# Patient Record
Sex: FEMALE | Employment: OTHER
[De-identification: names, ages, dates, MRNs, and addresses within clinical notes are randomized per-mention and may not be internally consistent; named-entity substitution may affect disease eponyms.]

---

## 2021-01-01 ENCOUNTER — OFFICE VISIT (OUTPATIENT)
Age: 61
End: 2021-01-01

## 2021-06-21 ENCOUNTER — TELEPHONE ENCOUNTER (OUTPATIENT)
Dept: URBAN - METROPOLITAN AREA CLINIC 9 | Facility: CLINIC | Age: 61
End: 2021-06-21

## 2021-07-01 ENCOUNTER — TELEPHONE ENCOUNTER (OUTPATIENT)
Dept: URBAN - METROPOLITAN AREA CLINIC 9 | Facility: CLINIC | Age: 61
End: 2021-07-01

## 2021-07-06 ENCOUNTER — TELEPHONE ENCOUNTER (OUTPATIENT)
Dept: URBAN - METROPOLITAN AREA CLINIC 9 | Facility: CLINIC | Age: 61
End: 2021-07-06

## 2021-07-07 ENCOUNTER — TELEPHONE ENCOUNTER (OUTPATIENT)
Dept: URBAN - METROPOLITAN AREA CLINIC 9 | Facility: CLINIC | Age: 61
End: 2021-07-07

## 2021-08-01 ENCOUNTER — OFFICE VISIT (OUTPATIENT)
Age: 61
End: 2021-08-01

## 2021-08-05 ENCOUNTER — OFFICE VISIT (OUTPATIENT)
Dept: URBAN - METROPOLITAN AREA CLINIC 9 | Facility: CLINIC | Age: 61
End: 2021-08-05

## 2021-12-22 ENCOUNTER — LAB OUTSIDE AN ENCOUNTER (OUTPATIENT)
Age: 61
End: 2021-12-22

## 2021-12-22 LAB
BUN/CREATININE RATIO: (no result)
CALCIUM: (no result)
CARBON DIOXIDE: (no result)
CHLORIDE: (no result)
CREATININE: (no result)
EGFR AFRICAN AMERICAN: 110
EGFR NON-AFR. AMERICAN: 94
GLUCOSE: (no result)
POTASSIUM: (no result)
SODIUM: (no result)
UREA NITROGEN (BUN): (no result)

## 2021-12-23 ENCOUNTER — TELEPHONE ENCOUNTER (OUTPATIENT)
Dept: URBAN - METROPOLITAN AREA CLINIC 9 | Facility: CLINIC | Age: 61
End: 2021-12-23

## 2022-05-05 ENCOUNTER — LAB OUTSIDE AN ENCOUNTER (OUTPATIENT)
Age: 62
End: 2022-05-05

## 2022-05-06 ENCOUNTER — TELEPHONE ENCOUNTER (OUTPATIENT)
Dept: URBAN - METROPOLITAN AREA CLINIC 9 | Facility: CLINIC | Age: 62
End: 2022-05-06

## 2022-05-06 LAB
BUN/CREATININE RATIO: (no result)
CALCIUM: (no result)
CARBON DIOXIDE: (no result)
CHLORIDE: (no result)
CREATININE: (no result)
EGFR AFRICAN AMERICAN: 114
EGFR NON-AFR. AMERICAN: 99
GLUCOSE: (no result)
POTASSIUM: (no result)
SODIUM: (no result)
UREA NITROGEN (BUN): (no result)

## 2022-05-17 ENCOUNTER — OFFICE VISIT (OUTPATIENT)
Dept: URBAN - METROPOLITAN AREA CLINIC 9 | Facility: CLINIC | Age: 62
End: 2022-05-17

## 2022-05-31 ENCOUNTER — OFFICE VISIT (OUTPATIENT)
Dept: URBAN - METROPOLITAN AREA CLINIC 9 | Facility: CLINIC | Age: 62
End: 2022-05-31

## 2022-07-18 ENCOUNTER — TELEPHONE ENCOUNTER (OUTPATIENT)
Dept: URBAN - METROPOLITAN AREA CLINIC 9 | Facility: CLINIC | Age: 62
End: 2022-07-18

## 2022-07-30 ENCOUNTER — TELEPHONE ENCOUNTER (OUTPATIENT)
Age: 62
End: 2022-07-30

## 2022-07-30 RX ORDER — SUCRALFATE 1 G/10ML
10 MLS SUSPENSION ORAL
Qty: 0 | Refills: 4 | OUTPATIENT
Start: 2013-06-24 | End: 2017-04-21

## 2022-07-30 RX ORDER — PREDNISONE 10 MG/1
1 (ONE) TABLET ORAL AS DIRECTED
Qty: 0 | Refills: 2 | OUTPATIENT
Start: 2018-02-28 | End: 2018-04-29

## 2022-07-30 RX ORDER — OMEPRAZOLE 20 MG/1
1 CAPSULE, DELAYED RELEASE ORAL AS NEEDED
Qty: 0 | Refills: 16 | OUTPATIENT
Start: 2012-11-15 | End: 2017-04-21

## 2022-07-30 RX ORDER — MESALAMINE 800 MG/1
2 (TWO) TABLET, DELAYED RELEASE ORAL
Qty: 0 | Refills: 13 | OUTPATIENT
Start: 2018-02-28 | End: 2018-02-28

## 2022-07-30 RX ORDER — MESALAMINE 800 MG/1
3 (THREE) TABLET, DELAYED RELEASE ORAL DAILY
Qty: 0 | Refills: 2 | OUTPATIENT
Start: 2014-08-29 | End: 2017-04-21

## 2022-07-30 RX ORDER — PREDNISONE 10 MG/1
1 (ONE) TABLET ORAL
Qty: 0 | Refills: 2 | OUTPATIENT
Start: 2013-06-24 | End: 2013-08-19

## 2022-07-30 RX ORDER — METRONIDAZOLE 500 MG/1
1 (ONE) TABLET ORAL
Qty: 0 | Refills: 2 | OUTPATIENT
Start: 2018-02-23 | End: 2018-02-28

## 2022-07-30 RX ORDER — MESALAMINE 800 MG/1
3 (THREE) TABLET, DELAYED RELEASE ORAL DAILY
Qty: 0 | Refills: 2 | OUTPATIENT
Start: 2017-05-16 | End: 2017-06-15

## 2022-07-30 RX ORDER — MESALAMINE 800 MG/1
2 (TWO) TABLET, DELAYED RELEASE ORAL
Qty: 0 | Refills: 13 | OUTPATIENT
Start: 2017-06-25 | End: 2018-02-28

## 2022-07-30 RX ORDER — HYDROCORTISONE ACETATE 25 MG/1
1 (ONE) SUPPOSITORY RECTAL
Qty: 0 | Refills: 3 | OUTPATIENT
Start: 2013-06-10 | End: 2013-06-24

## 2022-07-30 RX ORDER — METRONIDAZOLE 500 MG/1
1 (ONE) TABLET ORAL
Qty: 0 | Refills: 2 | OUTPATIENT
Start: 2018-08-20 | End: 2018-08-30

## 2022-07-30 RX ORDER — MESALAMINE 800 MG/1
3 (THREE) TABLET, DELAYED RELEASE ORAL DAILY
Qty: 0 | Refills: 2 | OUTPATIENT
Start: 2014-07-02 | End: 2014-08-01

## 2022-07-30 RX ORDER — MESALAMINE 800 MG/1
3 (THREE) TABLET, DELAYED RELEASE ORAL DAILY
Qty: 0 | Refills: 2 | OUTPATIENT
Start: 2017-06-20 | End: 2017-07-20

## 2022-07-30 RX ORDER — PREDNISONE 10 MG/1
1 (ONE) TABLET ORAL
Qty: 0 | Refills: 2 | OUTPATIENT
Start: 2012-01-19 | End: 2012-03-15

## 2022-07-30 RX ORDER — METRONIDAZOLE 500 MG/1
1 (ONE) TABLET ORAL
Qty: 0 | Refills: 2 | OUTPATIENT
Start: 2017-07-19 | End: 2017-07-26

## 2022-07-30 RX ORDER — CIPROFLOXACIN HCL 500 MG
1 (ONE) TABLET ORAL
Qty: 0 | Refills: 3 | OUTPATIENT
Start: 2013-06-10 | End: 2013-06-24

## 2022-07-30 RX ORDER — OMEPRAZOLE 20 MG/1
1 (ONE) TABLET, DELAYED RELEASE ORAL
Qty: 0 | Refills: 2 | OUTPATIENT
Start: 2021-07-06 | End: 2021-08-05

## 2022-07-30 RX ORDER — SUCRALFATE 1 G/10ML
10 MLS SUSPENSION ORAL
Qty: 0 | Refills: 4 | OUTPATIENT
Start: 2012-11-30 | End: 2013-06-10

## 2022-07-30 RX ORDER — MESALAMINE 1.2 G/1
2 (TWO) TABLET, DELAYED RELEASE ORAL DAILY
Qty: 0 | Refills: 16 | OUTPATIENT
Start: 2021-08-05 | End: 2022-05-31

## 2022-07-30 RX ORDER — MULTIVIT-MIN/IRON/FOLIC ACID/K 18-600-40
1 (ONE) CAPSULE ORAL
Qty: 0 | Refills: 16 | OUTPATIENT
Start: 2019-03-25 | End: 2022-05-31

## 2022-07-30 RX ORDER — ACETAMINOPHEN 325 MG/1
2 (TWO) TABLET, FILM COATED ORAL
Qty: 0 | Refills: 2 | OUTPATIENT
Start: 2013-06-25 | End: 2013-07-25

## 2022-07-30 RX ORDER — METRONIDAZOLE 375 MG/1
1 CAPSULE ORAL
Qty: 0 | Refills: 2 | OUTPATIENT
Start: 2013-06-10 | End: 2013-06-20

## 2022-07-30 RX ORDER — BUDESONIDE 3 MG/1
1 (ONE) CAPSULE, COATED PELLETS ORAL
Qty: 0 | Refills: 3 | OUTPATIENT
Start: 2018-09-19 | End: 2019-03-25

## 2022-07-30 RX ORDER — PREDNISONE 10 MG/1
1 (ONE) TABLET ORAL AS DIRECTED
Qty: 0 | Refills: 2 | OUTPATIENT
Start: 2017-06-23 | End: 2017-08-18

## 2022-07-30 RX ORDER — PREDNISONE 10 MG/1
2 (TWO) TABLET ORAL DAILY
Qty: 0 | Refills: 2 | OUTPATIENT
Start: 2017-05-26 | End: 2017-06-09

## 2022-07-31 ENCOUNTER — TELEPHONE ENCOUNTER (OUTPATIENT)
Age: 62
End: 2022-07-31

## 2022-07-31 RX ORDER — MESALAMINE 800 MG/1
3 (THREE) TABLET, DELAYED RELEASE ORAL DAILY
Qty: 0 | Refills: 2 | Status: ACTIVE | COMMUNITY
Start: 2014-07-02

## 2022-07-31 RX ORDER — MESALAMINE 800 MG/1
3 (THREE) TABLET, DELAYED RELEASE ORAL DAILY
Qty: 0 | Refills: 9 | Status: ACTIVE | COMMUNITY
Start: 2012-11-15

## 2022-07-31 RX ORDER — MESALAMINE 800 MG/1
3 (THREE) TABLET, DELAYED RELEASE ORAL DAILY
Qty: 0 | Refills: 2 | Status: ACTIVE | COMMUNITY
Start: 2014-02-27

## 2022-07-31 RX ORDER — MESALAMINE 800 MG/1
3 (THREE) TABLET, DELAYED RELEASE ORAL DAILY
Qty: 0 | Refills: 4 | Status: ACTIVE | COMMUNITY
Start: 2014-01-30

## 2022-07-31 RX ORDER — PREDNISONE 10 MG/1
1 (ONE) TABLET ORAL AS DIRECTED
Qty: 0 | Refills: 2 | Status: ACTIVE | COMMUNITY
Start: 2018-02-28

## 2022-07-31 RX ORDER — MESALAMINE 800 MG/1
2 (TWO) TABLET, DELAYED RELEASE ORAL
Qty: 0 | Refills: 13 | Status: ACTIVE | COMMUNITY
Start: 2017-06-25

## 2022-07-31 RX ORDER — MESALAMINE 800 MG/1
3 (THREE) TABLET, DELAYED RELEASE ORAL DAILY
Qty: 0 | Refills: 2 | Status: ACTIVE | COMMUNITY
Start: 2017-05-15

## 2022-07-31 RX ORDER — MESALAMINE 800 MG/1
3 (THREE) TABLET, DELAYED RELEASE ORAL DAILY
Qty: 0 | Refills: 2 | Status: ACTIVE | COMMUNITY
Start: 2017-05-16

## 2022-07-31 RX ORDER — MESALAMINE 800 MG/1
3 (THREE) TABLET, DELAYED RELEASE ORAL DAILY
Qty: 0 | Refills: 2 | Status: ACTIVE | COMMUNITY
Start: 2017-06-20

## 2022-07-31 RX ORDER — MESALAMINE 800 MG/1
3 (THREE) TABLET, DELAYED RELEASE ORAL DAILY
Qty: 0 | Refills: 2 | Status: ACTIVE | COMMUNITY
Start: 2017-04-21

## 2022-07-31 RX ORDER — SUCRALFATE 1 G/10ML
10 MLS SUSPENSION ORAL
Qty: 0 | Refills: 4 | Status: ACTIVE | COMMUNITY
Start: 2012-11-30

## 2022-07-31 RX ORDER — MESALAMINE 1.2 G/1
2 (TWO) TABLET, DELAYED RELEASE ORAL DAILY
Qty: 0 | Refills: 16 | Status: ACTIVE | COMMUNITY
Start: 2021-08-05

## 2022-07-31 RX ORDER — MESALAMINE 1.2 G/1
4 (FOUR) TABLET, DELAYED RELEASE ORAL DAILY
Qty: 0 | Refills: 16 | Status: ACTIVE | COMMUNITY
Start: 2019-03-12

## 2022-07-31 RX ORDER — OMEPRAZOLE 20 MG/1
1 CAPSULE, DELAYED RELEASE ORAL AS NEEDED
Qty: 0 | Refills: 16 | Status: ACTIVE | COMMUNITY

## 2022-07-31 RX ORDER — METRONIDAZOLE 500 MG/1
1 (ONE) TABLET ORAL
Qty: 0 | Refills: 2 | Status: ACTIVE | COMMUNITY
Start: 2018-02-23

## 2022-07-31 RX ORDER — PREDNISONE 10 MG/1
1 (ONE) TABLET ORAL
Qty: 0 | Refills: 2 | Status: ACTIVE | COMMUNITY
Start: 2012-01-19

## 2022-07-31 RX ORDER — MESALAMINE 1.2 G/1
4 (FOUR) TABLET, DELAYED RELEASE ORAL DAILY
Qty: 0 | Refills: 5 | Status: ACTIVE | COMMUNITY
Start: 2021-07-01

## 2022-07-31 RX ORDER — MULTIVIT-MIN/IRON/FOLIC ACID/K 18-600-40
1 (ONE) CAPSULE ORAL
Qty: 0 | Refills: 16 | Status: ACTIVE | COMMUNITY
Start: 2019-03-25

## 2022-07-31 RX ORDER — MESALAMINE 1.2 G/1
4 (FOUR) TABLET, DELAYED RELEASE ORAL DAILY
Qty: 0 | Refills: 3 | Status: ACTIVE | COMMUNITY
Start: 2021-07-07

## 2022-07-31 RX ORDER — MESALAMINE 1.2 G/1
4 (FOUR) TABLET, DELAYED RELEASE ORAL DAILY
Qty: 0 | Refills: 6 | Status: ACTIVE | COMMUNITY
Start: 2020-05-26

## 2022-07-31 RX ORDER — MESALAMINE 800 MG/1
3 (THREE) TABLET, DELAYED RELEASE ORAL DAILY
Qty: 0 | Refills: 9 | Status: ACTIVE | COMMUNITY
Start: 2012-04-18

## 2022-07-31 RX ORDER — ACETAMINOPHEN 325 MG/1
2 (TWO) TABLET, FILM COATED ORAL
Qty: 0 | Refills: 2 | Status: ACTIVE | COMMUNITY
Start: 2013-06-25

## 2022-07-31 RX ORDER — MESALAMINE 1.2 G/1
4 (FOUR) TABLET, DELAYED RELEASE ORAL DAILY
Qty: 0 | Refills: 6 | Status: ACTIVE | COMMUNITY
Start: 2019-03-14

## 2022-07-31 RX ORDER — MULTIVIT-MIN/IRON/FOLIC ACID/K 18-600-40
1 (ONE) CAPSULE ORAL
Qty: 0 | Refills: 16 | Status: ACTIVE | COMMUNITY
Start: 2018-08-20

## 2022-07-31 RX ORDER — METRONIDAZOLE 500 MG/1
1 (ONE) TABLET ORAL
Qty: 0 | Refills: 2 | Status: ACTIVE | COMMUNITY
Start: 2018-08-20

## 2022-07-31 RX ORDER — PREDNISONE 10 MG/1
2 (TWO) TABLET ORAL DAILY
Qty: 0 | Refills: 2 | Status: ACTIVE | COMMUNITY
Start: 2017-05-26

## 2022-07-31 RX ORDER — METRONIDAZOLE 500 MG/1
1 (ONE) TABLET ORAL
Qty: 0 | Refills: 2 | Status: ACTIVE | COMMUNITY
Start: 2017-07-19

## 2022-07-31 RX ORDER — PREDNISONE 10 MG/1
1 (ONE) TABLET ORAL AS DIRECTED
Qty: 0 | Refills: 2 | Status: ACTIVE | COMMUNITY
Start: 2017-06-23

## 2022-07-31 RX ORDER — MESALAMINE 800 MG/1
3 (THREE) TABLET, DELAYED RELEASE ORAL DAILY
Qty: 0 | Refills: 2 | Status: ACTIVE | COMMUNITY
Start: 2014-01-31

## 2022-07-31 RX ORDER — MESALAMINE 800 MG/1
2 (TWO) TABLET, DELAYED RELEASE ORAL
Qty: 0 | Refills: 5 | Status: ACTIVE | COMMUNITY
Start: 2017-06-23

## 2022-07-31 RX ORDER — MESALAMINE 800 MG/1
1 TABLET, DELAYED RELEASE ORAL
Qty: 0 | Refills: 16 | Status: ACTIVE | COMMUNITY

## 2022-07-31 RX ORDER — MESALAMINE 800 MG/1
3 (THREE) TABLET, DELAYED RELEASE ORAL DAILY
Qty: 0 | Refills: 2 | Status: ACTIVE | COMMUNITY
Start: 2014-08-29

## 2022-07-31 RX ORDER — MESALAMINE 800 MG/1
3 (THREE) TABLET, DELAYED RELEASE ORAL DAILY
Qty: 0 | Refills: 2 | Status: ACTIVE | COMMUNITY
Start: 2014-02-26

## 2022-07-31 RX ORDER — OMEPRAZOLE 20 MG/1
1 (ONE) TABLET, DELAYED RELEASE ORAL
Qty: 0 | Refills: 5 | Status: ACTIVE | COMMUNITY
Start: 2021-07-06

## 2022-07-31 RX ORDER — FAMOTIDINE 20 MG/1
1 TABLET ORAL
Qty: 0 | Refills: 16 | Status: ACTIVE | COMMUNITY
Start: 2022-05-31

## 2022-07-31 RX ORDER — MULTIVIT-MIN/IRON/FOLIC ACID/K 18-600-40
1 (ONE) CAPSULE ORAL
Qty: 0 | Refills: 16 | Status: ACTIVE | COMMUNITY
Start: 2018-09-19

## 2022-07-31 RX ORDER — SUCRALFATE 1 G/10ML
10 MLS SUSPENSION ORAL
Qty: 0 | Refills: 4 | Status: ACTIVE | COMMUNITY
Start: 2013-06-24

## 2022-07-31 RX ORDER — MESALAMINE 800 MG/1
3 (THREE) TABLET, DELAYED RELEASE ORAL DAILY
Qty: 0 | Refills: 16 | Status: ACTIVE | COMMUNITY
Start: 2012-01-19

## 2022-07-31 RX ORDER — HYDROCORTISONE ACETATE 25 MG/1
1 (ONE) SUPPOSITORY RECTAL
Qty: 0 | Refills: 3 | Status: ACTIVE | COMMUNITY
Start: 2013-06-10

## 2022-07-31 RX ORDER — OMEPRAZOLE 20 MG/1
1 CAPSULE, DELAYED RELEASE ORAL AS NEEDED
Qty: 0 | Refills: 16 | Status: ACTIVE | COMMUNITY
Start: 2012-11-15

## 2022-07-31 RX ORDER — METRONIDAZOLE 375 MG/1
1 CAPSULE ORAL
Qty: 0 | Refills: 2 | Status: ACTIVE | COMMUNITY
Start: 2013-06-10

## 2022-07-31 RX ORDER — MESALAMINE 1.2 G/1
4 (FOUR) TABLET, DELAYED RELEASE ORAL DAILY
Qty: 0 | Refills: 16 | Status: ACTIVE | COMMUNITY
Start: 2018-08-20

## 2022-07-31 RX ORDER — MESALAMINE 800 MG/1
3 (THREE) TABLET, DELAYED RELEASE ORAL DAILY
Qty: 0 | Refills: 2 | Status: ACTIVE | COMMUNITY
Start: 2012-01-23

## 2022-07-31 RX ORDER — MESALAMINE 1.2 G/1
4 (FOUR) TABLET, DELAYED RELEASE ORAL DAILY
Qty: 0 | Refills: 16 | Status: ACTIVE | COMMUNITY
Start: 2018-09-19

## 2022-07-31 RX ORDER — MESALAMINE 1.2 G/1
4 (FOUR) TABLET, DELAYED RELEASE ORAL DAILY
Qty: 0 | Refills: 16 | Status: ACTIVE | COMMUNITY
Start: 2018-02-28

## 2022-07-31 RX ORDER — BUDESONIDE 3 MG/1
1 (ONE) CAPSULE, COATED PELLETS ORAL
Qty: 0 | Refills: 3 | Status: ACTIVE | COMMUNITY
Start: 2018-08-20

## 2022-07-31 RX ORDER — MULTIVIT-MIN/IRON/FOLIC ACID/K 18-600-40
1 (ONE) CAPSULE ORAL
Qty: 0 | Refills: 16 | Status: ACTIVE | COMMUNITY
Start: 2018-05-09

## 2022-07-31 RX ORDER — MESALAMINE 1.2 G/1
4 (FOUR) TABLET, DELAYED RELEASE ORAL DAILY
Qty: 0 | Refills: 6 | Status: ACTIVE | COMMUNITY
Start: 2019-03-25

## 2022-07-31 RX ORDER — CIPROFLOXACIN HCL 500 MG
1 (ONE) TABLET ORAL
Qty: 0 | Refills: 3 | Status: ACTIVE | COMMUNITY
Start: 2013-06-10

## 2022-07-31 RX ORDER — MULTIVIT-MIN/IRON/FOLIC ACID/K 18-600-40
1 (ONE) CAPSULE CAPSULE ORAL
Qty: 0 | Refills: 16 | Status: ACTIVE | COMMUNITY
Start: 2018-02-28

## 2022-07-31 RX ORDER — PREDNISONE 10 MG/1
1 (ONE) TABLET ORAL
Qty: 0 | Refills: 2 | Status: ACTIVE | COMMUNITY
Start: 2013-06-24

## 2022-07-31 RX ORDER — MESALAMINE 1.2 G/1
4 (FOUR) TABLET, DELAYED RELEASE ORAL DAILY
Qty: 0 | Refills: 16 | Status: ACTIVE | COMMUNITY
Start: 2018-05-09

## 2022-07-31 RX ORDER — MESALAMINE 800 MG/1
2 (TWO) TABLET DR TABLET, DELAYED RELEASE ORAL
Qty: 0 | Refills: 13 | Status: ACTIVE | COMMUNITY
Start: 2018-02-28

## 2022-07-31 RX ORDER — OMEPRAZOLE 20 MG/1
1 (ONE) TABLET, DELAYED RELEASE ORAL
Qty: 0 | Refills: 16 | Status: ACTIVE | COMMUNITY
Start: 2019-09-09

## 2022-07-31 RX ORDER — BUDESONIDE 3 MG/1
1 (ONE) CAPSULE, COATED PELLETS ORAL
Qty: 0 | Refills: 3 | Status: ACTIVE | COMMUNITY
Start: 2018-09-19

## 2022-09-16 ENCOUNTER — OFFICE VISIT (OUTPATIENT)
Dept: URBAN - METROPOLITAN AREA SURGERY CENTER 9 | Facility: SURGERY CENTER | Age: 62
End: 2022-09-16

## 2022-09-19 ENCOUNTER — TELEPHONE ENCOUNTER (OUTPATIENT)
Dept: URBAN - METROPOLITAN AREA CLINIC 7 | Facility: CLINIC | Age: 62
End: 2022-09-19

## 2022-09-21 ENCOUNTER — OFFICE VISIT (OUTPATIENT)
Dept: URBAN - METROPOLITAN AREA SURGERY CENTER 9 | Facility: SURGERY CENTER | Age: 62
End: 2022-09-21

## 2024-07-08 ENCOUNTER — TELEPHONE ENCOUNTER (OUTPATIENT)
Dept: URBAN - METROPOLITAN AREA CLINIC 7 | Facility: CLINIC | Age: 64
End: 2024-07-08

## 2024-07-18 ENCOUNTER — DASHBOARD ENCOUNTERS (OUTPATIENT)
Age: 64
End: 2024-07-18

## 2024-07-18 ENCOUNTER — OFFICE VISIT (OUTPATIENT)
Dept: URBAN - METROPOLITAN AREA CLINIC 9 | Facility: CLINIC | Age: 64
End: 2024-07-18
Payer: MEDICARE

## 2024-07-18 VITALS
DIASTOLIC BLOOD PRESSURE: 74 MMHG | SYSTOLIC BLOOD PRESSURE: 126 MMHG | HEIGHT: 66 IN | WEIGHT: 148 LBS | BODY MASS INDEX: 23.78 KG/M2

## 2024-07-18 DIAGNOSIS — R16.0 LIVER MASS: ICD-10-CM

## 2024-07-18 DIAGNOSIS — K21.9 GASTROESOPHAGEAL REFLUX DISEASE, UNSPECIFIED WHETHER ESOPHAGITIS PRESENT: ICD-10-CM

## 2024-07-18 DIAGNOSIS — K51.00 CHRONIC PANCOLONIC ULCERATIVE COLITIS: ICD-10-CM

## 2024-07-18 DIAGNOSIS — R10.13 EPIGASTRIC ABDOMINAL PAIN: ICD-10-CM

## 2024-07-18 PROBLEM — 235595009: Status: ACTIVE | Noted: 2024-07-18

## 2024-07-18 PROCEDURE — 99214 OFFICE O/P EST MOD 30 MIN: CPT | Performed by: INTERNAL MEDICINE

## 2024-07-18 RX ORDER — HYDROCORTISONE ACETATE 25 MG/1
1 (ONE) SUPPOSITORY RECTAL
Qty: 0 | Refills: 3 | Status: ON HOLD | COMMUNITY
Start: 2013-06-10

## 2024-07-18 RX ORDER — LIDOCAINE HYDROCHLORIDE 20 MG/ML
MIX 15MLS OF LIDOCAINE WITH 15MLS OF MAALOX AND CAN TAKE UP TO 5 TIMES DAILY AS NEEDED. PRE OR POST MEAL SOLUTION ORAL; TOPICAL
Qty: 300 MILLILITER | Refills: 0 | Status: ACTIVE | COMMUNITY

## 2024-07-18 RX ORDER — MESALAMINE 800 MG/1
3 (THREE) TABLET, DELAYED RELEASE ORAL DAILY
Qty: 0 | Refills: 2 | Status: ON HOLD | COMMUNITY
Start: 2014-01-31

## 2024-07-18 RX ORDER — MESALAMINE 1.2 G/1
4 (FOUR) TABLET, DELAYED RELEASE ORAL DAILY
Qty: 0 | Refills: 16 | Status: ON HOLD | COMMUNITY
Start: 2019-03-12

## 2024-07-18 RX ORDER — ACETAMINOPHEN 325 MG/1
2 (TWO) TABLET, FILM COATED ORAL
Qty: 0 | Refills: 2 | Status: ON HOLD | COMMUNITY
Start: 2013-06-25

## 2024-07-18 RX ORDER — CIPROFLOXACIN HCL 500 MG
1 (ONE) TABLET ORAL
Qty: 0 | Refills: 3 | Status: ON HOLD | COMMUNITY
Start: 2013-06-10

## 2024-07-18 RX ORDER — METRONIDAZOLE 500 MG/1
1 (ONE) TABLET ORAL
Qty: 0 | Refills: 2 | Status: ON HOLD | COMMUNITY
Start: 2018-02-23

## 2024-07-18 RX ORDER — FAMOTIDINE 20 MG/1
1 TABLET ORAL
Qty: 0 | Refills: 16 | Status: ON HOLD | COMMUNITY
Start: 2022-05-31

## 2024-07-18 RX ORDER — OMEPRAZOLE 20 MG/1
1 CAPSULE, DELAYED RELEASE ORAL AS NEEDED
Qty: 0 | Refills: 16 | Status: ON HOLD | COMMUNITY

## 2024-07-18 RX ORDER — SUCRALFATE 1 G/10ML
10 MLS SUSPENSION ORAL
Qty: 0 | Refills: 4 | Status: ON HOLD | COMMUNITY
Start: 2013-06-24

## 2024-07-18 RX ORDER — PREDNISONE 10 MG/1
1 (ONE) TABLET ORAL AS DIRECTED
Qty: 0 | Refills: 2 | Status: ON HOLD | COMMUNITY
Start: 2017-06-23

## 2024-07-18 RX ORDER — OMEPRAZOLE 40 MG/1
1 (ONE) CAPSULE, DELAYED RELEASE ORAL TWICE DAILY
Qty: 60 | Refills: 3 | OUTPATIENT
Start: 2019-09-09

## 2024-07-18 RX ORDER — MULTIVIT-MIN/IRON/FOLIC ACID/K 18-600-40
1 (ONE) CAPSULE ORAL
Qty: 0 | Refills: 16 | Status: ON HOLD | COMMUNITY
Start: 2018-05-09

## 2024-07-18 RX ORDER — METRONIDAZOLE 375 MG/1
1 CAPSULE ORAL
Qty: 0 | Refills: 2 | Status: ON HOLD | COMMUNITY
Start: 2013-06-10

## 2024-07-18 RX ORDER — BUDESONIDE 3 MG/1
1 (ONE) CAPSULE, COATED PELLETS ORAL
Qty: 0 | Refills: 3 | Status: ON HOLD | COMMUNITY
Start: 2018-08-20

## 2024-07-18 RX ORDER — OMEPRAZOLE 20 MG/1
1 (ONE) TABLET, DELAYED RELEASE ORAL
Qty: 0 | Refills: 16 | Status: ON HOLD | COMMUNITY
Start: 2019-09-09

## 2024-07-18 NOTE — HPI-TODAY'S VISIT:
Pt here for evaluation of upper abd pain for the past few weeks without improvement in sx. . pt with two ED visit in 7/2024 pt did have CT x2  . Hx/o longstanding pan UC dx in 1990s Was on mesalamine with control Pt did not f/u and was supposed to have a colon in 2022 but never scheduled. She now is off the 5-asa . 2024 CT a/p with 10 cm hepatic cyst but no explanation for pain . Pt has not been on PPI CBC, BMP, lfts reviewed . Pt needs EGD for eval of the abd pain. I also will start PPI. I also advised she needs a colonoscopy for her hx/o UC. She is agreeable. RTC 2 months. .

## 2024-07-29 ENCOUNTER — TELEPHONE ENCOUNTER (OUTPATIENT)
Dept: URBAN - METROPOLITAN AREA CLINIC 9 | Facility: CLINIC | Age: 64
End: 2024-07-29

## 2024-08-05 ENCOUNTER — OFFICE VISIT (OUTPATIENT)
Dept: URBAN - METROPOLITAN AREA SURGERY CENTER 9 | Facility: SURGERY CENTER | Age: 64
End: 2024-08-05

## 2024-08-05 ENCOUNTER — TELEPHONE ENCOUNTER (OUTPATIENT)
Dept: URBAN - METROPOLITAN AREA CLINIC 9 | Facility: CLINIC | Age: 64
End: 2024-08-05

## 2024-08-22 ENCOUNTER — CLAIMS CREATED FROM THE CLAIM WINDOW (OUTPATIENT)
Dept: URBAN - METROPOLITAN AREA CLINIC 4 | Facility: CLINIC | Age: 64
End: 2024-08-22
Payer: MEDICARE

## 2024-08-22 ENCOUNTER — OFFICE VISIT (OUTPATIENT)
Dept: URBAN - METROPOLITAN AREA SURGERY CENTER 9 | Facility: SURGERY CENTER | Age: 64
End: 2024-08-22
Payer: MEDICARE

## 2024-08-22 DIAGNOSIS — K22.89 OTHER SPECIFIED DISEASE OF ESOPHAGUS: ICD-10-CM

## 2024-08-22 DIAGNOSIS — K57.30 DIVERTICULOSIS OF LARGE INTESTINE WITHOUT PERFORATION OR ABSCESS WITHOUT BLEEDING: ICD-10-CM

## 2024-08-22 DIAGNOSIS — Z12.11 ENCOUNTER FOR SCREENING FOR MALIGNANT NEOPLASM OF COLON: ICD-10-CM

## 2024-08-22 DIAGNOSIS — K21.9 GASTRO-ESOPHAGEAL REFLUX DISEASE WITHOUT ESOPHAGITIS: ICD-10-CM

## 2024-08-22 DIAGNOSIS — K51.00 ULCERATIVE (CHRONIC) PANCOLITIS WITHOUT COMPLICATIONS: ICD-10-CM

## 2024-08-22 DIAGNOSIS — K64.1 SECOND DEGREE HEMORRHOIDS: ICD-10-CM

## 2024-08-22 DIAGNOSIS — K31.89 OTHER DISEASES OF STOMACH AND DUODENUM: ICD-10-CM

## 2024-08-22 DIAGNOSIS — K63.89 OTHER SPECIFIED DISEASES OF INTESTINE: ICD-10-CM

## 2024-08-22 PROCEDURE — 43239 EGD BIOPSY SINGLE/MULTIPLE: CPT | Performed by: INTERNAL MEDICINE

## 2024-08-22 PROCEDURE — 45380 COLONOSCOPY AND BIOPSY: CPT | Performed by: CLINIC/CENTER

## 2024-08-22 PROCEDURE — 45380 COLONOSCOPY AND BIOPSY: CPT | Performed by: INTERNAL MEDICINE

## 2024-08-22 PROCEDURE — 43239 EGD BIOPSY SINGLE/MULTIPLE: CPT | Performed by: CLINIC/CENTER

## 2024-08-22 PROCEDURE — 88305 TISSUE EXAM BY PATHOLOGIST: CPT | Performed by: PATHOLOGY

## 2024-08-22 PROCEDURE — 00813 ANES UPR LWR GI NDSC PX: CPT | Performed by: NURSE ANESTHETIST, CERTIFIED REGISTERED

## 2024-08-22 PROCEDURE — 88312 SPECIAL STAINS GROUP 1: CPT | Performed by: PATHOLOGY

## 2024-08-22 RX ORDER — BUDESONIDE 3 MG/1
1 (ONE) CAPSULE, COATED PELLETS ORAL
Qty: 0 | Refills: 3 | Status: ON HOLD | COMMUNITY
Start: 2018-08-20

## 2024-08-22 RX ORDER — FAMOTIDINE 20 MG/1
1 TABLET ORAL
Qty: 0 | Refills: 16 | Status: ON HOLD | COMMUNITY
Start: 2022-05-31

## 2024-08-22 RX ORDER — MESALAMINE 800 MG/1
3 (THREE) TABLET, DELAYED RELEASE ORAL DAILY
Qty: 0 | Refills: 2 | Status: ON HOLD | COMMUNITY
Start: 2014-01-31

## 2024-08-22 RX ORDER — MESALAMINE 1.2 G/1
4 (FOUR) TABLET, DELAYED RELEASE ORAL DAILY
Qty: 0 | Refills: 16 | Status: ON HOLD | COMMUNITY
Start: 2019-03-12

## 2024-08-22 RX ORDER — MULTIVIT-MIN/IRON/FOLIC ACID/K 18-600-40
1 (ONE) CAPSULE ORAL
Qty: 0 | Refills: 16 | Status: ON HOLD | COMMUNITY
Start: 2018-05-09

## 2024-08-22 RX ORDER — ACETAMINOPHEN 325 MG/1
2 (TWO) TABLET, FILM COATED ORAL
Qty: 0 | Refills: 2 | Status: ON HOLD | COMMUNITY
Start: 2013-06-25

## 2024-08-22 RX ORDER — HYDROCORTISONE ACETATE 25 MG/1
1 (ONE) SUPPOSITORY RECTAL
Qty: 0 | Refills: 3 | Status: ON HOLD | COMMUNITY
Start: 2013-06-10

## 2024-08-22 RX ORDER — PREDNISONE 10 MG/1
1 (ONE) TABLET ORAL AS DIRECTED
Qty: 0 | Refills: 2 | Status: ON HOLD | COMMUNITY
Start: 2017-06-23

## 2024-08-22 RX ORDER — METRONIDAZOLE 500 MG/1
1 (ONE) TABLET ORAL
Qty: 0 | Refills: 2 | Status: ON HOLD | COMMUNITY
Start: 2018-02-23

## 2024-08-22 RX ORDER — CIPROFLOXACIN HCL 500 MG
1 (ONE) TABLET ORAL
Qty: 0 | Refills: 3 | Status: ON HOLD | COMMUNITY
Start: 2013-06-10

## 2024-08-22 RX ORDER — METRONIDAZOLE 375 MG/1
1 CAPSULE ORAL
Qty: 0 | Refills: 2 | Status: ON HOLD | COMMUNITY
Start: 2013-06-10

## 2024-08-22 RX ORDER — LIDOCAINE HYDROCHLORIDE 20 MG/ML
MIX 15MLS OF LIDOCAINE WITH 15MLS OF MAALOX AND CAN TAKE UP TO 5 TIMES DAILY AS NEEDED. PRE OR POST MEAL SOLUTION ORAL; TOPICAL
Qty: 300 MILLILITER | Refills: 0 | Status: ACTIVE | COMMUNITY

## 2024-08-22 RX ORDER — SUCRALFATE 1 G/10ML
10 MLS SUSPENSION ORAL
Qty: 0 | Refills: 4 | Status: ON HOLD | COMMUNITY
Start: 2013-06-24

## 2024-08-22 RX ORDER — OMEPRAZOLE 40 MG/1
1 (ONE) CAPSULE, DELAYED RELEASE ORAL TWICE DAILY
Qty: 60 | Refills: 3 | Status: ACTIVE | COMMUNITY
Start: 2019-09-09

## 2024-08-22 RX ORDER — OMEPRAZOLE 20 MG/1
1 CAPSULE, DELAYED RELEASE ORAL AS NEEDED
Qty: 0 | Refills: 16 | Status: ON HOLD | COMMUNITY

## 2024-08-23 ENCOUNTER — TELEPHONE ENCOUNTER (OUTPATIENT)
Dept: URBAN - METROPOLITAN AREA CLINIC 9 | Facility: CLINIC | Age: 64
End: 2024-08-23

## 2024-08-23 RX ORDER — DICYCLOMINE HYDROCHLORIDE 10 MG/1
AS DIRECTED CAPSULE ORAL
Qty: 60 | Refills: 2 | OUTPATIENT
Start: 2024-08-23 | End: 2024-11-20

## 2024-08-26 ENCOUNTER — TELEPHONE ENCOUNTER (OUTPATIENT)
Dept: URBAN - METROPOLITAN AREA CLINIC 7 | Facility: CLINIC | Age: 64
End: 2024-08-26

## 2024-08-27 ENCOUNTER — TELEPHONE ENCOUNTER (OUTPATIENT)
Dept: URBAN - METROPOLITAN AREA CLINIC 9 | Facility: CLINIC | Age: 64
End: 2024-08-27

## 2024-08-27 ENCOUNTER — OFFICE VISIT (OUTPATIENT)
Dept: URBAN - METROPOLITAN AREA SURGERY CENTER 9 | Facility: SURGERY CENTER | Age: 64
End: 2024-08-27
Payer: MEDICARE

## 2024-08-27 ENCOUNTER — CLAIMS CREATED FROM THE CLAIM WINDOW (OUTPATIENT)
Dept: URBAN - METROPOLITAN AREA SURGERY CENTER 9 | Facility: SURGERY CENTER | Age: 64
End: 2024-08-27

## 2024-08-27 DIAGNOSIS — K86.89 OTHER SPECIFIED DISEASES OF PANCREAS: ICD-10-CM

## 2024-08-27 DIAGNOSIS — K29.70 GASTRITIS WITHOUT BLEEDING, UNSPECIFIED CHRONICITY, UNSPECIFIED GASTRITIS TYPE: ICD-10-CM

## 2024-08-27 PROCEDURE — 43259 EGD US EXAM DUODENUM/JEJUNUM: CPT | Performed by: INTERNAL MEDICINE

## 2024-08-27 PROCEDURE — 43259 EGD US EXAM DUODENUM/JEJUNUM: CPT | Performed by: CLINIC/CENTER

## 2024-08-27 RX ORDER — ACETAMINOPHEN 325 MG/1
2 (TWO) TABLET, FILM COATED ORAL
Qty: 0 | Refills: 2 | Status: ON HOLD | COMMUNITY
Start: 2013-06-25

## 2024-08-27 RX ORDER — OMEPRAZOLE 40 MG/1
1 (ONE) CAPSULE, DELAYED RELEASE ORAL TWICE DAILY
Qty: 60 | Refills: 3 | Status: ACTIVE | COMMUNITY
Start: 2019-09-09

## 2024-08-27 RX ORDER — PREDNISONE 10 MG/1
1 (ONE) TABLET ORAL AS DIRECTED
Qty: 0 | Refills: 2 | Status: ON HOLD | COMMUNITY
Start: 2017-06-23

## 2024-08-27 RX ORDER — DICYCLOMINE HYDROCHLORIDE 10 MG/1
AS DIRECTED CAPSULE ORAL
Qty: 60 | Refills: 2 | Status: ACTIVE | COMMUNITY
Start: 2024-08-23 | End: 2024-11-20

## 2024-08-27 RX ORDER — METRONIDAZOLE 375 MG/1
1 CAPSULE ORAL
Qty: 0 | Refills: 2 | Status: ON HOLD | COMMUNITY
Start: 2013-06-10

## 2024-08-27 RX ORDER — CIPROFLOXACIN HCL 500 MG
1 (ONE) TABLET ORAL
Qty: 0 | Refills: 3 | Status: ON HOLD | COMMUNITY
Start: 2013-06-10

## 2024-08-27 RX ORDER — METRONIDAZOLE 500 MG/1
1 (ONE) TABLET ORAL
Qty: 0 | Refills: 2 | Status: ON HOLD | COMMUNITY
Start: 2018-02-23

## 2024-08-27 RX ORDER — BUDESONIDE 3 MG/1
1 (ONE) CAPSULE, COATED PELLETS ORAL
Qty: 0 | Refills: 3 | Status: ON HOLD | COMMUNITY
Start: 2018-08-20

## 2024-08-27 RX ORDER — MESALAMINE 800 MG/1
3 (THREE) TABLET, DELAYED RELEASE ORAL DAILY
Qty: 0 | Refills: 2 | Status: ON HOLD | COMMUNITY
Start: 2014-01-31

## 2024-08-27 RX ORDER — MESALAMINE 1.2 G/1
4 (FOUR) TABLET, DELAYED RELEASE ORAL DAILY
Qty: 0 | Refills: 16 | Status: ON HOLD | COMMUNITY
Start: 2019-03-12

## 2024-08-27 RX ORDER — HYDROCORTISONE ACETATE 25 MG/1
1 (ONE) SUPPOSITORY RECTAL
Qty: 0 | Refills: 3 | Status: ON HOLD | COMMUNITY
Start: 2013-06-10

## 2024-08-27 RX ORDER — OMEPRAZOLE 20 MG/1
1 CAPSULE, DELAYED RELEASE ORAL AS NEEDED
Qty: 0 | Refills: 16 | Status: ON HOLD | COMMUNITY

## 2024-08-27 RX ORDER — FAMOTIDINE 20 MG/1
1 TABLET ORAL
Qty: 0 | Refills: 16 | Status: ON HOLD | COMMUNITY
Start: 2022-05-31

## 2024-08-27 RX ORDER — SUCRALFATE 1 G/10ML
10 MLS SUSPENSION ORAL
Qty: 0 | Refills: 4 | Status: ON HOLD | COMMUNITY
Start: 2013-06-24

## 2024-08-27 RX ORDER — MULTIVIT-MIN/IRON/FOLIC ACID/K 18-600-40
1 (ONE) CAPSULE ORAL
Qty: 0 | Refills: 16 | Status: ON HOLD | COMMUNITY
Start: 2018-05-09

## 2024-08-27 RX ORDER — LIDOCAINE HYDROCHLORIDE 20 MG/ML
MIX 15MLS OF LIDOCAINE WITH 15MLS OF MAALOX AND CAN TAKE UP TO 5 TIMES DAILY AS NEEDED. PRE OR POST MEAL SOLUTION ORAL; TOPICAL
Qty: 300 MILLILITER | Refills: 0 | Status: ACTIVE | COMMUNITY

## 2024-08-28 ENCOUNTER — OFFICE VISIT (OUTPATIENT)
Dept: URBAN - METROPOLITAN AREA SURGERY CENTER 9 | Facility: SURGERY CENTER | Age: 64
End: 2024-08-28

## 2024-09-05 ENCOUNTER — TELEPHONE ENCOUNTER (OUTPATIENT)
Dept: URBAN - METROPOLITAN AREA CLINIC 9 | Facility: CLINIC | Age: 64
End: 2024-09-05

## 2024-09-25 ENCOUNTER — OFFICE VISIT (OUTPATIENT)
Dept: URBAN - METROPOLITAN AREA CLINIC 9 | Facility: CLINIC | Age: 64
End: 2024-09-25

## 2024-10-02 ENCOUNTER — OFFICE VISIT (OUTPATIENT)
Dept: URBAN - METROPOLITAN AREA CLINIC 9 | Facility: CLINIC | Age: 64
End: 2024-10-02

## 2024-10-03 ENCOUNTER — OFFICE VISIT (OUTPATIENT)
Dept: URBAN - METROPOLITAN AREA CLINIC 9 | Facility: CLINIC | Age: 64
End: 2024-10-03
Payer: MEDICARE

## 2024-10-03 VITALS
DIASTOLIC BLOOD PRESSURE: 70 MMHG | HEIGHT: 66 IN | SYSTOLIC BLOOD PRESSURE: 112 MMHG | BODY MASS INDEX: 23.63 KG/M2 | WEIGHT: 147 LBS

## 2024-10-03 DIAGNOSIS — K51.00 ULCERATIVE (CHRONIC) PANCOLITIS WITHOUT COMPLICATIONS: ICD-10-CM

## 2024-10-03 DIAGNOSIS — K21.9 GASTROESOPHAGEAL REFLUX DISEASE, UNSPECIFIED WHETHER ESOPHAGITIS PRESENT: ICD-10-CM

## 2024-10-03 PROCEDURE — 99214 OFFICE O/P EST MOD 30 MIN: CPT | Performed by: INTERNAL MEDICINE

## 2024-10-03 RX ORDER — SUCRALFATE 1 G/10ML
10 MLS SUSPENSION ORAL
Qty: 0 | Refills: 4 | Status: ON HOLD | COMMUNITY
Start: 2013-06-24

## 2024-10-03 RX ORDER — ACETAMINOPHEN 325 MG/1
2 (TWO) TABLET, FILM COATED ORAL
Qty: 0 | Refills: 2 | Status: ON HOLD | COMMUNITY
Start: 2013-06-25

## 2024-10-03 RX ORDER — PREDNISONE 10 MG/1
1 (ONE) TABLET ORAL AS DIRECTED
Qty: 0 | Refills: 2 | Status: ON HOLD | COMMUNITY
Start: 2017-06-23

## 2024-10-03 RX ORDER — OMEPRAZOLE 20 MG/1
1 CAPSULE, DELAYED RELEASE ORAL AS NEEDED
Qty: 0 | Refills: 16 | Status: ON HOLD | COMMUNITY

## 2024-10-03 RX ORDER — OMEPRAZOLE 40 MG/1
1 (ONE) CAPSULE, DELAYED RELEASE ORAL TWICE DAILY
Qty: 60 | Refills: 3 | Status: ACTIVE | COMMUNITY
Start: 2019-09-09

## 2024-10-03 RX ORDER — DICYCLOMINE HYDROCHLORIDE 10 MG/1
AS DIRECTED CAPSULE ORAL
Qty: 60 | Refills: 2 | Status: ACTIVE | COMMUNITY
Start: 2024-08-23 | End: 2024-11-20

## 2024-10-03 RX ORDER — CIPROFLOXACIN HCL 500 MG
1 (ONE) TABLET ORAL
Qty: 0 | Refills: 3 | Status: ON HOLD | COMMUNITY
Start: 2013-06-10

## 2024-10-03 RX ORDER — METRONIDAZOLE 500 MG/1
1 (ONE) TABLET ORAL
Qty: 0 | Refills: 2 | Status: ON HOLD | COMMUNITY
Start: 2018-02-23

## 2024-10-03 RX ORDER — FAMOTIDINE 20 MG/1
1 TABLET ORAL
Qty: 0 | Refills: 16 | Status: ON HOLD | COMMUNITY
Start: 2022-05-31

## 2024-10-03 RX ORDER — HYDROCORTISONE ACETATE 25 MG/1
1 (ONE) SUPPOSITORY RECTAL
Qty: 0 | Refills: 3 | Status: ON HOLD | COMMUNITY
Start: 2013-06-10

## 2024-10-03 RX ORDER — MESALAMINE 1.2 G/1
4 (FOUR) TABLET, DELAYED RELEASE ORAL DAILY
Qty: 0 | Refills: 16 | Status: ON HOLD | COMMUNITY
Start: 2019-03-12

## 2024-10-03 RX ORDER — BUDESONIDE 3 MG/1
1 (ONE) CAPSULE, COATED PELLETS ORAL
Qty: 0 | Refills: 3 | Status: ON HOLD | COMMUNITY
Start: 2018-08-20

## 2024-10-03 RX ORDER — METRONIDAZOLE 375 MG/1
1 CAPSULE ORAL
Qty: 0 | Refills: 2 | Status: ON HOLD | COMMUNITY
Start: 2013-06-10

## 2024-10-03 RX ORDER — OMEPRAZOLE 40 MG/1
1 (ONE) CAPSULE, DELAYED RELEASE ORAL TWICE DAILY
OUTPATIENT
Start: 2019-09-09

## 2024-10-03 RX ORDER — LIDOCAINE HYDROCHLORIDE 20 MG/ML
MIX 15MLS OF LIDOCAINE WITH 15MLS OF MAALOX AND CAN TAKE UP TO 5 TIMES DAILY AS NEEDED. PRE OR POST MEAL SOLUTION ORAL; TOPICAL
Qty: 300 MILLILITER | Refills: 0 | Status: DISCONTINUED | COMMUNITY

## 2024-10-03 RX ORDER — MULTIVIT-MIN/IRON/FOLIC ACID/K 18-600-40
1 (ONE) CAPSULE ORAL
Qty: 0 | Refills: 16 | Status: ON HOLD | COMMUNITY
Start: 2018-05-09

## 2024-10-03 RX ORDER — DICYCLOMINE HYDROCHLORIDE 10 MG/1
AS DIRECTED CAPSULE ORAL
OUTPATIENT
Start: 2024-08-23

## 2024-10-03 RX ORDER — MESALAMINE 800 MG/1
3 (THREE) TABLET, DELAYED RELEASE ORAL DAILY
Qty: 0 | Refills: 2 | Status: ON HOLD | COMMUNITY
Start: 2014-01-31

## 2024-10-03 NOTE — HPI-TODAY'S VISIT:
Pt here for f/u of abd pain . pt with two ED visit in 7/2024 pt did have CT x2  . Hx/o longstanding pan UC dx in 1990s Was on mesalamine with control Pt did not f/u and was supposed to have a colon in 2022 but never scheduled. She now is off the 5-asa . 2024 CT a/p with 10 cm hepatic cyst but no explanation for pain S/p surgical eval with no need for surgery . 2024 EGD with bx neg for hitchcock's 2024 Colon wiht bx neg . Pt was on bid ppi but no major change CBC, BMP, lfts reviewed . Pt here for f/u. She took 1 week of abx for a UTI and her Gi sx dramatically improved suggesting SIBO may be the etiology. I advised given bx neg for hitchcock's and suspicion for SIBO I would have her wean off PPI. She is doing great. RTC prn. .  .

## 2025-01-27 ENCOUNTER — TELEPHONE ENCOUNTER (OUTPATIENT)
Dept: URBAN - METROPOLITAN AREA CLINIC 9 | Facility: CLINIC | Age: 65
End: 2025-01-27

## 2025-01-27 RX ORDER — OMEPRAZOLE 40 MG/1
1 CAPSULE 1/2 TO 1 HOUR BEFORE MORNING MEAL CAPSULE, DELAYED RELEASE ORAL ONCE A DAY
Qty: 90 | Refills: 3
Start: 2019-09-09

## 2025-04-28 ENCOUNTER — TELEPHONE ENCOUNTER (OUTPATIENT)
Dept: URBAN - METROPOLITAN AREA CLINIC 95 | Facility: CLINIC | Age: 65
End: 2025-04-28